# Patient Record
Sex: FEMALE | Race: WHITE | NOT HISPANIC OR LATINO | URBAN - METROPOLITAN AREA
[De-identification: names, ages, dates, MRNs, and addresses within clinical notes are randomized per-mention and may not be internally consistent; named-entity substitution may affect disease eponyms.]

---

## 2020-07-25 ENCOUNTER — TELEPHONE ENCOUNTER (OUTPATIENT)
Dept: URBAN - METROPOLITAN AREA CLINIC 13 | Facility: CLINIC | Age: 60
End: 2020-07-25

## 2020-07-25 RX ORDER — CELECOXIB 200 MG/1
TAKE 1 CAPSULE DAILY WITH A MEAL CAPSULE ORAL
Refills: 0 | OUTPATIENT
Start: 2018-04-20 | End: 2019-05-07

## 2020-07-26 ENCOUNTER — TELEPHONE ENCOUNTER (OUTPATIENT)
Dept: URBAN - METROPOLITAN AREA CLINIC 13 | Facility: CLINIC | Age: 60
End: 2020-07-26

## 2020-07-26 RX ORDER — SULFAMETHOXAZOLE AND TRIMETHOPRIM 800; 160 MG/1; MG/1
TABLET ORAL
Qty: 28 | Refills: 0 | Status: ACTIVE | COMMUNITY
Start: 2019-03-05

## 2020-07-26 RX ORDER — ZOLPIDEM TARTRATE 5 MG/1
TABLET, FILM COATED ORAL
Qty: 30 | Refills: 0 | Status: ACTIVE | COMMUNITY
Start: 2019-10-16

## 2020-07-26 RX ORDER — CEPHALEXIN 500 MG/1
CAPSULE ORAL
Qty: 14 | Refills: 0 | Status: ACTIVE | COMMUNITY
Start: 2018-07-09

## 2020-07-26 RX ORDER — ALBUTEROL SULFATE 2.5 MG/3ML
SOLUTION RESPIRATORY (INHALATION)
Qty: 90 | Refills: 0 | Status: ACTIVE | COMMUNITY
Start: 2020-04-30

## 2020-07-26 RX ORDER — LORAZEPAM 0.5 MG/1
TABLET ORAL
Qty: 30 | Refills: 0 | Status: ACTIVE | COMMUNITY
Start: 2019-05-21

## 2020-07-26 RX ORDER — ONDANSETRON 8 MG/1
TAKE 1 TABLET 3 TIMES DAILY AS NEEDED NAUSEA BEFORE MEALS TAKE 30MINUTES BEF ORE MEALS TABLET ORAL
Qty: 30 | Refills: 0 | Status: ACTIVE | COMMUNITY
Start: 2018-06-14

## 2020-07-26 RX ORDER — OXYCODONE 15 MG/1
TAKE 1 TABLET EVERY 6 HOURS AS NEEDED FOR PAIN TABLET ORAL
Refills: 0 | Status: ACTIVE | COMMUNITY
Start: 2018-04-19

## 2020-07-26 RX ORDER — HYOSCYAMINE SULFATE 0.12 MG/1
TAKE 1 TABLET BY MOUTH EVERY 6 HOURS AS NEEDED FOR PAIN TABLET ORAL
Qty: 30 | Refills: 1 | Status: ACTIVE | COMMUNITY
Start: 2019-04-16

## 2020-07-26 RX ORDER — MIRABEGRON 25 MG/1
TABLET, FILM COATED, EXTENDED RELEASE ORAL
Qty: 30 | Refills: 0 | Status: ACTIVE | COMMUNITY
Start: 2018-06-01

## 2020-07-26 RX ORDER — NYSTATIN 100000 [USP'U]/ML
SUSPENSION ORAL
Qty: 30 | Refills: 0 | Status: ACTIVE | COMMUNITY
Start: 2019-03-19

## 2020-07-26 RX ORDER — BESIFLOXACIN HCL 0.6 %
SUSPENSION, DROPS(FINAL DOSAGE FORM)(ML) OPHTHALMIC (EYE)
Qty: 5 | Refills: 0 | Status: ACTIVE | COMMUNITY
Start: 2019-04-26

## 2020-07-26 RX ORDER — ONDANSETRON HYDROCHLORIDE 4 MG/1
TABLET, FILM COATED ORAL
Qty: 15 | Refills: 0 | Status: ACTIVE | COMMUNITY
Start: 2019-05-23

## 2020-07-26 RX ORDER — BESIFLOXACIN HCL 0.6 %
SUSPENSION, DROPS(FINAL DOSAGE FORM)(ML) OPHTHALMIC (EYE)
Qty: 5 | Refills: 0 | Status: ACTIVE | COMMUNITY
Start: 2019-05-21

## 2020-07-26 RX ORDER — FLUTICASONE FUROATE AND VILANTEROL TRIFENATATE 100; 25 UG/1; UG/1
INHALER CONTAINS EITHER 30 DAY SUPPLY (FROM 60 BLISTERS) OR 14 DAY SUPPLY (FROM 28 BLISTERS).  TWO BLISTERS, 1 FROM EACH STRIP EQUALS 1 DOSE POWDER RESPIRATORY (INHALATION)
Refills: 0 | Status: ACTIVE | COMMUNITY

## 2020-07-26 RX ORDER — DIAZEPAM 5 MG/1
TAKE 2 TABLETS PRIOR TO MRI AS DIRECTED TABLET ORAL
Qty: 2 | Refills: 0 | Status: ACTIVE | COMMUNITY
Start: 2019-09-30

## 2020-07-26 RX ORDER — ESOMEPRAZOLE MAGNESIUM 40 MG
TAKE 1 CAPSULE ONCE DAILY CAPSULE,DELAYED RELEASE (ENTERIC COATED) ORAL
Refills: 11 | Status: ACTIVE | COMMUNITY
Start: 2018-02-23

## 2020-07-26 RX ORDER — CEPHALEXIN 500 MG/1
CAPSULE ORAL
Qty: 12 | Refills: 0 | Status: ACTIVE | COMMUNITY
Start: 2019-06-06

## 2020-07-26 RX ORDER — DICLOFENAC SODIUM 10 MG/G
GEL TOPICAL
Qty: 100 | Refills: 0 | Status: ACTIVE | COMMUNITY
Start: 2019-11-11

## 2020-07-26 RX ORDER — ALBUTEROL SULFATE 2.5 MG/3ML
SOLUTION RESPIRATORY (INHALATION)
Qty: 90 | Refills: 0 | Status: ACTIVE | COMMUNITY
Start: 2018-05-15

## 2020-07-26 RX ORDER — MIRABEGRON 25 MG/1
TABLET, FILM COATED, EXTENDED RELEASE ORAL
Qty: 30 | Refills: 0 | Status: ACTIVE | COMMUNITY
Start: 2018-02-05

## 2020-07-26 RX ORDER — TAPENTADOL HYDROCHLORIDE 100 MG/1
TABLET, FILM COATED, EXTENDED RELEASE ORAL
Qty: 60 | Refills: 0 | Status: ACTIVE | COMMUNITY
Start: 2018-05-19

## 2020-07-26 RX ORDER — FLUTICASONE FUROATE AND VILANTEROL TRIFENATATE 100; 25 UG/1; UG/1
POWDER RESPIRATORY (INHALATION)
Qty: 60 | Refills: 0 | Status: ACTIVE | COMMUNITY
Start: 2018-05-15

## 2020-07-26 RX ORDER — CYANOCOBALAMIN 1000 UG/ML
INJECT 1 ML INTRAMUSCULARLY ONCE A MONTH INJECTION INTRAMUSCULAR; SUBCUTANEOUS
Qty: 12 | Refills: 0 | Status: ACTIVE | COMMUNITY
Start: 2017-08-02

## 2020-07-26 RX ORDER — RIZATRIPTAN BENZOATE 10 MG/1
TABLET ORAL
Qty: 10 | Refills: 0 | Status: ACTIVE | COMMUNITY
Start: 2019-05-17

## 2020-07-26 RX ORDER — CLINDAMYCIN HYDROCHLORIDE 300 MG/1
CAPSULE ORAL
Qty: 21 | Refills: 0 | Status: ACTIVE | COMMUNITY
Start: 2019-01-05

## 2020-07-26 RX ORDER — ALBUTEROL SULFATE 90 UG/1
AEROSOL, METERED RESPIRATORY (INHALATION)
Qty: 18 | Refills: 0 | Status: ACTIVE | COMMUNITY
Start: 2019-03-21

## 2020-07-26 RX ORDER — LOTEPREDNOL ETABONATE 5 MG/G
GEL OPHTHALMIC
Qty: 5 | Refills: 0 | Status: ACTIVE | COMMUNITY
Start: 2019-04-26

## 2020-07-26 RX ORDER — MUPIROCIN 20 MG/G
OINTMENT TOPICAL
Qty: 22 | Refills: 0 | Status: ACTIVE | COMMUNITY
Start: 2019-03-22

## 2020-07-26 RX ORDER — CLINDAMYCIN HYDROCHLORIDE 150 MG/1
CAPSULE ORAL
Qty: 21 | Refills: 0 | Status: ACTIVE | COMMUNITY
Start: 2019-01-05

## 2020-07-26 RX ORDER — LORAZEPAM 0.5 MG/1
TABLET ORAL
Qty: 30 | Refills: 0 | Status: ACTIVE | COMMUNITY
Start: 2018-07-09

## 2020-07-26 RX ORDER — TOPIRAMATE 50 MG/1
TABLET, FILM COATED ORAL
Qty: 7 | Refills: 0 | Status: ACTIVE | COMMUNITY
Start: 2018-11-26

## 2020-07-26 RX ORDER — ALBUTEROL SULFATE 90 UG/1
INHALE 1 PUFF EVERY 4 HOURS AS NEEDED AEROSOL, METERED RESPIRATORY (INHALATION)
Refills: 0 | Status: ACTIVE | COMMUNITY

## 2020-07-26 RX ORDER — TAPENTADOL HYDROCHLORIDE 50 MG/1
TAKE 1 TABLET TWICE DAILY TABLET, FILM COATED, EXTENDED RELEASE ORAL
Refills: 0 | Status: ACTIVE | COMMUNITY
Start: 2018-11-06

## 2020-07-26 RX ORDER — RIZATRIPTAN BENZOATE 10 MG/1
TABLET ORAL
Qty: 10 | Refills: 0 | Status: ACTIVE | COMMUNITY
Start: 2017-07-06

## 2020-07-26 RX ORDER — SULFAMETHOXAZOLE AND TRIMETHOPRIM 800; 160 MG/1; MG/1
TABLET ORAL
Qty: 14 | Refills: 0 | Status: ACTIVE | COMMUNITY
Start: 2019-02-20

## 2020-07-26 RX ORDER — ERGOCALCIFEROL 1.25 MG/1
CAPSULE ORAL
Qty: 12 | Refills: 0 | Status: ACTIVE | COMMUNITY
Start: 2019-06-27

## 2020-07-26 RX ORDER — LOTEPREDNOL ETABONATE 5 MG/G
GEL OPHTHALMIC
Qty: 5 | Refills: 0 | Status: ACTIVE | COMMUNITY
Start: 2019-05-21

## 2020-07-26 RX ORDER — ONDANSETRON 8 MG/1
TABLET, ORALLY DISINTEGRATING ORAL
Qty: 30 | Refills: 0 | Status: ACTIVE | COMMUNITY
Start: 2020-04-02

## 2020-07-26 RX ORDER — LISINOPRIL 2.5 MG/1
TAKE 1 TABLET DAILY TABLET ORAL
Refills: 0 | Status: ACTIVE | COMMUNITY
Start: 2018-02-05

## 2020-07-26 RX ORDER — TAPENTADOL HYDROCHLORIDE 100 MG/1
TABLET, FILM COATED, EXTENDED RELEASE ORAL
Qty: 60 | Refills: 0 | Status: ACTIVE | COMMUNITY
Start: 2018-04-18

## 2020-07-26 RX ORDER — LIDOCAINE 50 MG/G
APPLY 1 PATCH TO THE AFFECTED AREA AND LEAVE IN PLACE FOR 12 HOURS, THEN REMOVE AND LEAVE OFF FOR 12 HOURS PATCH TOPICAL
Refills: 0 | Status: ACTIVE | COMMUNITY
Start: 2018-05-22

## 2020-07-26 RX ORDER — RIZATRIPTAN BENZOATE 10 MG/1
TABLET ORAL
Qty: 10 | Refills: 0 | Status: ACTIVE | COMMUNITY
Start: 2018-05-19

## 2020-07-26 RX ORDER — CEPHALEXIN 500 MG/1
CAPSULE ORAL
Qty: 28 | Refills: 0 | Status: ACTIVE | COMMUNITY
Start: 2018-07-18

## 2020-11-30 ENCOUNTER — CLAIMS CREATED FROM THE CLAIM WINDOW (OUTPATIENT)
Dept: URBAN - METROPOLITAN AREA MEDICAL CENTER 19 | Facility: MEDICAL CENTER | Age: 60
End: 2020-11-30
Payer: COMMERCIAL

## 2020-11-30 DIAGNOSIS — K25.9 GASTRIC ULCER, UNSPECIFIED AS ACUTE OR CHRONIC, WITHOUT HEMORRHAGE OR PERFORATION: ICD-10-CM

## 2020-11-30 DIAGNOSIS — R06.02 SHORTNESS OF BREATH: ICD-10-CM

## 2020-11-30 DIAGNOSIS — D62 ACUTE POSTHEMORRHAGIC ANEMIA: ICD-10-CM

## 2020-11-30 DIAGNOSIS — K92.1 MELENA: ICD-10-CM

## 2020-11-30 PROCEDURE — 43235 EGD DIAGNOSTIC BRUSH WASH: CPT | Performed by: INTERNAL MEDICINE

## 2020-11-30 PROCEDURE — 99254 IP/OBS CNSLTJ NEW/EST MOD 60: CPT | Performed by: INTERNAL MEDICINE

## 2020-12-01 ENCOUNTER — CLAIMS CREATED FROM THE CLAIM WINDOW (OUTPATIENT)
Dept: URBAN - METROPOLITAN AREA MEDICAL CENTER 19 | Facility: MEDICAL CENTER | Age: 60
End: 2020-12-01
Payer: COMMERCIAL

## 2020-12-01 DIAGNOSIS — R06.02 SHORTNESS OF BREATH: ICD-10-CM

## 2020-12-01 DIAGNOSIS — D62 ACUTE POSTHEMORRHAGIC ANEMIA: ICD-10-CM

## 2020-12-01 DIAGNOSIS — K25.9 GASTRIC ULCER, UNSPECIFIED AS ACUTE OR CHRONIC, WITHOUT HEMORRHAGE OR PERFORATION: ICD-10-CM

## 2020-12-01 DIAGNOSIS — K92.1 MELENA: ICD-10-CM

## 2020-12-01 PROCEDURE — 99232 SBSQ HOSP IP/OBS MODERATE 35: CPT | Performed by: INTERNAL MEDICINE

## 2020-12-14 ENCOUNTER — TELEPHONE ENCOUNTER (OUTPATIENT)
Dept: URBAN - METROPOLITAN AREA CLINIC 113 | Facility: CLINIC | Age: 60
End: 2020-12-14

## 2020-12-14 RX ORDER — DICYCLOMINE HYDROCHLORIDE 10 MG/1
1 TABLET CAPSULE ORAL
Qty: 60 | Refills: 0 | OUTPATIENT
Start: 2020-12-14 | End: 2021-01-13

## 2021-01-12 ENCOUNTER — OFFICE VISIT (OUTPATIENT)
Dept: URBAN - METROPOLITAN AREA CLINIC 113 | Facility: CLINIC | Age: 61
End: 2021-01-12

## 2021-01-12 NOTE — HPI-OTHER HISTORIES
60-year-old with history of COPD, vulvar cancer status post surgery in 2003, paternal history of colon cancer in his 40s.  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  She was seen by Dr. Patel at the [facility] and he performed an endoscopic ultrasound and identified a 1.3 cm cystic lesion in the head of the pancreas didn't appear to contain some necrotic tissue.  This was morphologically consistent with small walled off necrosis.  The lesion was too small for fine-needle aspiration.  He did not find any other abnormality of the bile duct.  She laparoscopic cholecystectomy and intraoperative cholangiogram by Dr. Peters on May 10, 2019.  This revealed a dilated gallbladder with gallstones present.  Intraoperative cholangiogram was negative for common bile duct stone there is no stricture seen and there was good flow into the duodenum.  She denies MRI of the abdomen on April 23, 2020 revealed a 10 mm common bile duct with abrupt termination at the ampulla.  No filling defects or stricture noted.  There was atrophy of the body and tail of the pancreas.  The pancreatic duct is mildly dilated at 10 mm.  She had infrarenal abdominal aortic aneurysm 31 x 25 mm in size.  The duodenum showed a fluid signal about 15 mm that may be a diverticulum. She was seen by Dr. Vo at [facility] when she presented with shortness of breath for 3 days and melena.  Hemoglobin was 6, WBC 7.6 and platelet counts 20 77,000.  Sodium is 136 potassium 3.4 BUS 33 creatinine 0.76.  Total bili was 0.3, AST 17, AST 16 and alkaline phosphatase of 94.  PT/INR is 1.08.  COVID test was negative.  She had an upper endoscopy on 11/30/20 that revealed a normal esophagus, 2 ulcers in the gastric antrum largest being 20 mm with no stigmata and a normal duodenum.  She was discharged home on 12/4/20

## 2021-03-02 ENCOUNTER — DASHBOARD ENCOUNTERS (OUTPATIENT)
Age: 61
End: 2021-03-02

## 2021-03-03 ENCOUNTER — OFFICE VISIT (OUTPATIENT)
Dept: URBAN - METROPOLITAN AREA CLINIC 113 | Facility: CLINIC | Age: 61
End: 2021-03-03

## 2021-03-03 NOTE — HPI-OTHER HISTORIES
60-year-old with history of COPD, vulvar cancer status post surgery in 2003, paternal history of colon cancer in his 40s.  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  She was seen by Dr. Patel at the [facility] and he performed an endoscopic ultrasound and identified a 1.3 cm cystic lesion in the head of the pancreas didn't appear to contain some necrotic tissue.  This was morphologically consistent with small walled off necrosis.  The lesion was too small for fine-needle aspiration.  He did not find any other abnormality of the bile duct.  She laparoscopic cholecystectomy and intraoperative cholangiogram by Dr. Peters on May 10, 2019.  This revealed a dilated gallbladder with gallstones present.  Intraoperative cholangiogram was negative for common bile duct stone there is no stricture seen and there was good flow into the duodenum.  She denies MRI of the abdomen on April 23, 2020 revealed a 10 mm common bile duct with abrupt termination at the ampulla.  No filling defects or stricture noted.  There was atrophy of the body and tail of the pancreas.  The pancreatic duct is mildly dilated at 10 mm.  She had infrarenal abdominal aortic aneurysm 31 x 25 mm in size.  The duodenum showed a fluid signal about 15 mm that may be a diverticulum. She was seen by Dr. Vo at [facility] when she presented with shortness of breath for 3 days and melena.  Hemoglobin was 6, WBC 7.6 and platelet counts 20 77,000.  Sodium is 136 potassium 3.4 BUS 33 creatinine 0.76.  Total bili was 0.3, AST 17, AST 16 and alkaline phosphatase of 94.  PT/INR is 1.08.  COVID test was negative.  She had an upper endoscopy on 11/30/20 that revealed a normal esophagus, 2 ulcers in the gastric antrum largest being 20 mm with no stigmata and a normal duodenum.  She was discharged home on 12/4/20

## 2021-03-13 ENCOUNTER — ERX REFILL RESPONSE (OUTPATIENT)
Dept: URBAN - METROPOLITAN AREA CLINIC 113 | Facility: CLINIC | Age: 61
End: 2021-03-13

## 2021-03-13 RX ORDER — DICYCLOMINE HYDROCHLORIDE 10 MG/1
1 TABLET CAPSULE ORAL
Qty: 60 | Refills: 0